# Patient Record
Sex: FEMALE | Race: BLACK OR AFRICAN AMERICAN | NOT HISPANIC OR LATINO | Employment: UNEMPLOYED | URBAN - METROPOLITAN AREA
[De-identification: names, ages, dates, MRNs, and addresses within clinical notes are randomized per-mention and may not be internally consistent; named-entity substitution may affect disease eponyms.]

---

## 2023-02-28 ENCOUNTER — SOCIAL WORK (OUTPATIENT)
Dept: ADMINISTRATIVE | Facility: OTHER | Age: 23
End: 2023-02-28

## 2023-02-28 NOTE — PROGRESS NOTES
SW met with pt regarding initial OB assessment. Pt stated this is her 1st pregnancy/0-miscarriage. Pt stated lives with her brother and able to perform ADL's independently. Pt stated does work. Pt stated support system is her sister/[place]. Pt stated has medicaid([facility] ). Pt stated does not have WIC. Pt stated undecided about breastfeed. SW provide pt with information on other community resources and referred to the [facility].GORGE faxed and scanned pt's notification of pregnancy into epic.  No other needs identified at this time.    JAYDA Power  Pager#[phone]

## 2023-03-01 PROBLEM — B19.20 HEPATITIS C VIRUS: Status: ACTIVE | Noted: 2023-03-01

## 2023-03-30 PROBLEM — R74.01 TRANSAMINITIS: Status: ACTIVE | Noted: 2023-03-30

## 2023-05-24 PROBLEM — G93.0 CHOROID PLEXUS CYST: Status: ACTIVE | Noted: 2023-05-24

## 2023-07-11 ENCOUNTER — PATIENT MESSAGE (OUTPATIENT)
Dept: RESEARCH | Facility: HOSPITAL | Age: 23
End: 2023-07-11

## 2023-07-19 ENCOUNTER — PATIENT MESSAGE (OUTPATIENT)
Dept: RESEARCH | Facility: HOSPITAL | Age: 23
End: 2023-07-19

## 2023-07-24 ENCOUNTER — PATIENT MESSAGE (OUTPATIENT)
Dept: RESEARCH | Facility: HOSPITAL | Age: 23
End: 2023-07-24

## 2023-08-28 PROBLEM — O99.820 GBS (GROUP B STREPTOCOCCUS CARRIER), +RV CULTURE, CURRENTLY PREGNANT: Status: ACTIVE | Noted: 2023-08-28

## 2023-09-04 PROBLEM — R74.01 TRANSAMINITIS: Status: RESOLVED | Noted: 2023-03-30 | Resolved: 2023-09-04

## 2023-09-04 PROBLEM — A74.9 CHLAMYDIA INFECTION AFFECTING PREGNANCY IN THIRD TRIMESTER: Status: ACTIVE | Noted: 2023-09-04

## 2023-09-04 PROBLEM — O98.813 CHLAMYDIA INFECTION AFFECTING PREGNANCY IN THIRD TRIMESTER: Status: ACTIVE | Noted: 2023-09-04
